# Patient Record
Sex: MALE | Race: WHITE | NOT HISPANIC OR LATINO | Employment: OTHER | URBAN - METROPOLITAN AREA
[De-identification: names, ages, dates, MRNs, and addresses within clinical notes are randomized per-mention and may not be internally consistent; named-entity substitution may affect disease eponyms.]

---

## 2018-07-26 ENCOUNTER — HISTORICAL (OUTPATIENT)
Dept: ADMINISTRATIVE | Facility: HOSPITAL | Age: 73
End: 2018-07-26

## 2018-07-26 LAB
ABS NEUT (OLG): 5.75 X10(3)/MCL (ref 2.1–9.2)
ALBUMIN SERPL-MCNC: 3.5 GM/DL (ref 3.4–5)
ALBUMIN/GLOB SERPL: 1.1 {RATIO}
ALP SERPL-CCNC: 48 UNIT/L (ref 50–136)
ALT SERPL-CCNC: 24 UNIT/L (ref 12–78)
APPEARANCE, UA: CLEAR
APTT PPP: 56.5 SECOND(S) (ref 24.8–36.9)
AST SERPL-CCNC: 13 UNIT/L (ref 15–37)
BACTERIA SPEC CULT: NORMAL /HPF
BASOPHILS # BLD AUTO: 0 X10(3)/MCL (ref 0–0.2)
BASOPHILS NFR BLD AUTO: 1 %
BILIRUB SERPL-MCNC: 0.3 MG/DL (ref 0.2–1)
BILIRUB UR QL STRIP: NEGATIVE
BILIRUBIN DIRECT+TOT PNL SERPL-MCNC: 0.1 MG/DL (ref 0–0.2)
BILIRUBIN DIRECT+TOT PNL SERPL-MCNC: 0.2 MG/DL (ref 0–0.8)
BUN SERPL-MCNC: 19 MG/DL (ref 7–18)
CALCIUM SERPL-MCNC: 9.4 MG/DL (ref 8.5–10.1)
CHLORIDE SERPL-SCNC: 104 MMOL/L (ref 98–107)
CO2 SERPL-SCNC: 29 MMOL/L (ref 21–32)
COLOR UR: YELLOW
CREAT SERPL-MCNC: 1.32 MG/DL (ref 0.7–1.3)
EOSINOPHIL # BLD AUTO: 0.2 X10(3)/MCL (ref 0–0.9)
EOSINOPHIL NFR BLD AUTO: 2 %
ERYTHROCYTE [DISTWIDTH] IN BLOOD BY AUTOMATED COUNT: 12.7 % (ref 11.5–17)
GLOBULIN SER-MCNC: 3.2 GM/DL (ref 2.4–3.5)
GLUCOSE (UA): NEGATIVE
GLUCOSE SERPL-MCNC: 150 MG/DL (ref 74–106)
HCT VFR BLD AUTO: 40.9 % (ref 42–52)
HGB BLD-MCNC: 13.6 GM/DL (ref 14–18)
HGB UR QL STRIP: NEGATIVE
INR PPP: 0.93 (ref 0–1.27)
KETONES UR QL STRIP: NEGATIVE
LEUKOCYTE ESTERASE UR QL STRIP: NEGATIVE
LYMPHOCYTES # BLD AUTO: 2.2 X10(3)/MCL (ref 0.6–4.6)
LYMPHOCYTES NFR BLD AUTO: 24 %
MCH RBC QN AUTO: 30.6 PG (ref 27–31)
MCHC RBC AUTO-ENTMCNC: 33.3 GM/DL (ref 33–36)
MCV RBC AUTO: 92.1 FL (ref 80–94)
MONOCYTES # BLD AUTO: 0.8 X10(3)/MCL (ref 0.1–1.3)
MONOCYTES NFR BLD AUTO: 9 %
NEUTROPHILS # BLD AUTO: 5.75 X10(3)/MCL (ref 2.1–9.2)
NEUTROPHILS NFR BLD AUTO: 64 %
NITRITE UR QL STRIP: NEGATIVE
PH UR STRIP: 5.5 [PH] (ref 5–9)
PLATELET # BLD AUTO: 241 X10(3)/MCL (ref 130–400)
PMV BLD AUTO: 9 FL (ref 9.4–12.4)
POTASSIUM SERPL-SCNC: 4.9 MMOL/L (ref 3.5–5.1)
PROT SERPL-MCNC: 6.7 GM/DL (ref 6.4–8.2)
PROT UR QL STRIP: NEGATIVE
PROTHROMBIN TIME: 12.8 SECOND(S) (ref 12.2–14.7)
RBC # BLD AUTO: 4.44 X10(6)/MCL (ref 4.7–6.1)
RBC #/AREA URNS HPF: NORMAL /[HPF]
SODIUM SERPL-SCNC: 140 MMOL/L (ref 136–145)
SP GR UR STRIP: 1.02 (ref 1–1.03)
SQUAMOUS EPITHELIAL, UA: NORMAL
UROBILINOGEN UR STRIP-ACNC: 0.2
WBC # SPEC AUTO: 9 X10(3)/MCL (ref 4.5–11.5)
WBC #/AREA URNS HPF: NORMAL /HPF

## 2020-09-28 ENCOUNTER — HISTORICAL (OUTPATIENT)
Dept: MEDSURG UNIT | Facility: HOSPITAL | Age: 75
End: 2020-09-28

## 2020-09-28 LAB
ABS NEUT (OLG): 7.15 X10(3)/MCL (ref 2.1–9.2)
BASOPHILS # BLD AUTO: 0 X10(3)/MCL (ref 0–0.2)
BASOPHILS NFR BLD AUTO: 0 %
BUN SERPL-MCNC: 18.5 MG/DL (ref 8.4–25.7)
CALCIUM SERPL-MCNC: 9.2 MG/DL (ref 8.8–10)
CHLORIDE SERPL-SCNC: 101 MMOL/L (ref 98–107)
CO2 SERPL-SCNC: 25 MMOL/L (ref 23–31)
CREAT SERPL-MCNC: 1.11 MG/DL (ref 0.73–1.18)
CREAT/UREA NIT SERPL: 17
EOSINOPHIL # BLD AUTO: 0.1 X10(3)/MCL (ref 0–0.9)
EOSINOPHIL NFR BLD AUTO: 1 %
ERYTHROCYTE [DISTWIDTH] IN BLOOD BY AUTOMATED COUNT: 13 % (ref 11.5–17)
GLUCOSE SERPL-MCNC: 320 MG/DL (ref 82–115)
HCT VFR BLD AUTO: 45 % (ref 42–52)
HGB BLD-MCNC: 14.6 GM/DL (ref 14–18)
LYMPHOCYTES # BLD AUTO: 1.8 X10(3)/MCL (ref 0.6–4.6)
LYMPHOCYTES NFR BLD AUTO: 18 %
MCH RBC QN AUTO: 30.2 PG (ref 27–31)
MCHC RBC AUTO-ENTMCNC: 32.4 GM/DL (ref 33–36)
MCV RBC AUTO: 93 FL (ref 80–94)
MONOCYTES # BLD AUTO: 0.7 X10(3)/MCL (ref 0.1–1.3)
MONOCYTES NFR BLD AUTO: 7 %
NEUTROPHILS # BLD AUTO: 7.15 X10(3)/MCL (ref 2.1–9.2)
NEUTROPHILS NFR BLD AUTO: 72 %
PLATELET # BLD AUTO: 275 X10(3)/MCL (ref 130–400)
PMV BLD AUTO: 9.7 FL (ref 9.4–12.4)
POTASSIUM SERPL-SCNC: 4.8 MMOL/L (ref 3.5–5.1)
RBC # BLD AUTO: 4.84 X10(6)/MCL (ref 4.7–6.1)
SODIUM SERPL-SCNC: 136 MMOL/L (ref 136–145)
WBC # SPEC AUTO: 9.9 X10(3)/MCL (ref 4.5–11.5)

## 2020-09-29 LAB
ABS NEUT (OLG): 8.95 X10(3)/MCL (ref 2.1–9.2)
ALBUMIN SERPL-MCNC: 3.7 GM/DL (ref 3.4–4.8)
ALBUMIN/GLOB SERPL: 1.4 RATIO (ref 1.1–2)
ALP SERPL-CCNC: 48 UNIT/L (ref 40–150)
ALT SERPL-CCNC: 15 UNIT/L (ref 0–55)
AST SERPL-CCNC: 16 UNIT/L (ref 5–34)
BASOPHILS # BLD AUTO: 0 X10(3)/MCL (ref 0–0.2)
BASOPHILS NFR BLD AUTO: 0 %
BILIRUB SERPL-MCNC: 0.6 MG/DL
BILIRUBIN DIRECT+TOT PNL SERPL-MCNC: 0.2 MG/DL (ref 0–0.5)
BILIRUBIN DIRECT+TOT PNL SERPL-MCNC: 0.4 MG/DL (ref 0–0.8)
BUN SERPL-MCNC: 19.4 MG/DL (ref 8.4–25.7)
CALCIUM SERPL-MCNC: 8.6 MG/DL (ref 8.8–10)
CHLORIDE SERPL-SCNC: 100 MMOL/L (ref 98–107)
CO2 SERPL-SCNC: 27 MMOL/L (ref 23–31)
CREAT SERPL-MCNC: 0.92 MG/DL (ref 0.73–1.18)
EOSINOPHIL # BLD AUTO: 0.1 X10(3)/MCL (ref 0–0.9)
EOSINOPHIL NFR BLD AUTO: 1 %
ERYTHROCYTE [DISTWIDTH] IN BLOOD BY AUTOMATED COUNT: 13 % (ref 11.5–17)
GLOBULIN SER-MCNC: 2.7 GM/DL (ref 2.4–3.5)
GLUCOSE SERPL-MCNC: 345 MG/DL (ref 82–115)
HCT VFR BLD AUTO: 40.8 % (ref 42–52)
HGB BLD-MCNC: 12.9 GM/DL (ref 14–18)
LYMPHOCYTES # BLD AUTO: 0.4 X10(3)/MCL (ref 0.6–4.6)
LYMPHOCYTES NFR BLD AUTO: 4 %
MCH RBC QN AUTO: 30.2 PG (ref 27–31)
MCHC RBC AUTO-ENTMCNC: 31.6 GM/DL (ref 33–36)
MCV RBC AUTO: 95.6 FL (ref 80–94)
MONOCYTES # BLD AUTO: 0.6 X10(3)/MCL (ref 0.1–1.3)
MONOCYTES NFR BLD AUTO: 6 %
NEUTROPHILS # BLD AUTO: 8.95 X10(3)/MCL (ref 2.1–9.2)
NEUTROPHILS NFR BLD AUTO: 88 %
PLATELET # BLD AUTO: 231 X10(3)/MCL (ref 130–400)
PMV BLD AUTO: 9.8 FL (ref 9.4–12.4)
POTASSIUM SERPL-SCNC: 5.1 MMOL/L (ref 3.5–5.1)
PROT SERPL-MCNC: 6.4 GM/DL (ref 5.8–7.6)
RBC # BLD AUTO: 4.27 X10(6)/MCL (ref 4.7–6.1)
SODIUM SERPL-SCNC: 135 MMOL/L (ref 136–145)
WBC # SPEC AUTO: 10.2 X10(3)/MCL (ref 4.5–11.5)

## 2021-05-14 ENCOUNTER — HISTORICAL (OUTPATIENT)
Dept: CARDIOLOGY | Facility: HOSPITAL | Age: 76
End: 2021-05-14

## 2022-04-30 NOTE — OP NOTE
DATE OF SURGERY:    09/28/2020    SURGEON:  Bishnu Galo MD    INDICATION FOR PROCEDURE:  Chest pain, positive stress test, severe carotid artery stenosis of the right internal carotid artery.     Coronary artery disease, status post CABG.    PROCEDURES PERFORMED:    1. Moderate sedation.  2. Right femoral artery access.  3. Selective right and left coronary angiogram.  4. Selective angiogram of vein graft to diagonal.  5. Selective angiogram of left internal mammary artery to left anterior descending.  6. Selective right and left internal carotid artery angiogram.  7. CSI atherectomy of the proximal right coronary artery.  8. Balloon angioplasty and stenting of the proximal right coronary artery using 3 x 24 mm drug-eluting stent.  9. Transvenous pacemaker placement.    PROCEDURE IN DETAIL:  The patient was brought to the cardiac catheterization laboratory in a fasting nonsedated state, prepped and draped in usual sterile fashion.  2% lidocaine was used in the right groin area as local anesthesia.  Then, using a micropuncture needle, right femoral artery was cannulated.  6-Cymro 11 cm arterial sheath was placed.  Then using a JL4 and JR4 catheter, left and right coronary ostia were selectively engaged.  Multiple angiographic views were obtained under fluoroscopy.  We used a JR4 catheter for the vein graft to the diagonal and the LIMA to the LAD.  Multiple angiographic views were obtained under fluoroscopy.  We used a JR4 catheter for angiogram of the bilateral carotids.     There was a heavily calcified 90% to 95% stenosis of the proximal RCA.  We could not even get a balloon down the proximal RCA.  We used a JR4 guide, engaged the RCA, wired this lesion with a Jose blue wire.  We could not get a balloon down, so proceeded with atherectomy.  We chose CSI rotational atherectomy.  We placed a transvenous pacemaker and did 2 passes with CSI atherectomy and orbital atherectomy.  Once the orbital  atherectomy was performed, we pre-dilated the lesion using a 2.75 balloon and stented it using a 3 x 24 mm drug-eluting stent.  Post-balloon angioplasty and stenting angiogram showed excellent stent position.  No evidence of residual stenosis, distal emboli or dissection.  All catheters and wires were then removed.  The procedure was deemed complete.    RESULTS:    1. The left main bifurcated into the LAD and left circumflex artery.  Left main has no significant stenosis or disease.  2. The left circumflex artery is a codominant system.  After the takeoff of the 2nd obtuse marginal, there was 50% stenosis in the left circumflex artery and gives rise to a moderate-sized PDA.  3. The LAD is totally occluded.  4. The right coronary artery has 95% stenosis, heavily calcified proximal lesion, status post CS atherectomy and balloon angioplasty and stenting using a 3 x 24 mm drug-eluting stent.  5. The vein graft to the diagonal is patent on the origin body and site of insertion fills the diagonal well in antegrade and retrograde fashion.  6. The LIMA to LAD is patent in the origin body and site of insertion.  It fills the LAD well in antegrade fashion and retrograde fashion.  7. The right common carotid is patent.  8. The right internal carotid artery has 50% stenosis.  9. The left common carotid is patent.  The left internal carotid artery has 10% to 20% stenosis.    PLAN/ASSESSMENT:  Orbital atherectomy and balloon angioplasty and stenting of proximal right coronary artery using a 3 x 24 mm drug-eluting stent.  The patient will be on aspirin and Plavix, patient will be discharged home later today.        ______________________________  MD DHAVAL Ibrahim/UW  DD:  09/28/2020  Time:  11:08AM  DT:  09/28/2020  Time:  11:32AM  Job #:  [id number]

## 2022-08-05 ENCOUNTER — HOSPITAL ENCOUNTER (INPATIENT)
Facility: HOSPITAL | Age: 77
LOS: 1 days | Discharge: HOME | DRG: 247 | End: 2022-08-06
Attending: INTERNAL MEDICINE | Admitting: INTERNAL MEDICINE
Payer: MEDICARE

## 2022-08-05 DIAGNOSIS — R94.8 ABNORMAL PET SCAN, MEDIASTINUM: Primary | ICD-10-CM

## 2022-08-05 DIAGNOSIS — R06.02 SOB (SHORTNESS OF BREATH): ICD-10-CM

## 2022-08-05 DIAGNOSIS — I25.10 CAD (CORONARY ARTERY DISEASE): ICD-10-CM

## 2022-08-05 LAB
POCT GLUCOSE: 131 MG/DL (ref 70–110)
POCT GLUCOSE: 170 MG/DL (ref 70–110)

## 2022-08-05 PROCEDURE — 93005 ELECTROCARDIOGRAM TRACING: CPT

## 2022-08-05 PROCEDURE — 27201423 OPTIME MED/SURG SUP & DEVICES STERILE SUPPLY: Performed by: INTERNAL MEDICINE

## 2022-08-05 PROCEDURE — C1885 CATH, TRANSLUMIN ANGIO LASER: HCPCS | Performed by: INTERNAL MEDICINE

## 2022-08-05 PROCEDURE — 92978 ENDOLUMINL IVUS OCT C 1ST: CPT | Performed by: INTERNAL MEDICINE

## 2022-08-05 PROCEDURE — 93010 ELECTROCARDIOGRAM REPORT: CPT | Mod: XE,,, | Performed by: INTERNAL MEDICINE

## 2022-08-05 PROCEDURE — C1725 CATH, TRANSLUMIN NON-LASER: HCPCS | Performed by: INTERNAL MEDICINE

## 2022-08-05 PROCEDURE — 93010 EKG 12-LEAD: ICD-10-PCS | Mod: 76,XE,, | Performed by: INTERNAL MEDICINE

## 2022-08-05 PROCEDURE — C1887 CATHETER, GUIDING: HCPCS | Performed by: INTERNAL MEDICINE

## 2022-08-05 PROCEDURE — 99152 MOD SED SAME PHYS/QHP 5/>YRS: CPT | Performed by: INTERNAL MEDICINE

## 2022-08-05 PROCEDURE — 99153 MOD SED SAME PHYS/QHP EA: CPT | Performed by: INTERNAL MEDICINE

## 2022-08-05 PROCEDURE — 25500020 PHARM REV CODE 255: Performed by: INTERNAL MEDICINE

## 2022-08-05 PROCEDURE — C1874 STENT, COATED/COV W/DEL SYS: HCPCS | Performed by: INTERNAL MEDICINE

## 2022-08-05 PROCEDURE — 25000003 PHARM REV CODE 250: Performed by: INTERNAL MEDICINE

## 2022-08-05 PROCEDURE — 93459 L HRT ART/GRFT ANGIO: CPT | Mod: XU | Performed by: INTERNAL MEDICINE

## 2022-08-05 PROCEDURE — C9600 PERC DRUG-EL COR STENT SING: HCPCS | Mod: LC | Performed by: INTERNAL MEDICINE

## 2022-08-05 PROCEDURE — 92924 PRQ TRLUML C ATHRC 1 ART&/BR: CPT | Mod: RC | Performed by: INTERNAL MEDICINE

## 2022-08-05 PROCEDURE — 11000001 HC ACUTE MED/SURG PRIVATE ROOM

## 2022-08-05 PROCEDURE — C1769 GUIDE WIRE: HCPCS | Performed by: INTERNAL MEDICINE

## 2022-08-05 PROCEDURE — C1753 CATH, INTRAVAS ULTRASOUND: HCPCS | Performed by: INTERNAL MEDICINE

## 2022-08-05 PROCEDURE — C1894 INTRO/SHEATH, NON-LASER: HCPCS | Performed by: INTERNAL MEDICINE

## 2022-08-05 PROCEDURE — 63600175 PHARM REV CODE 636 W HCPCS: Performed by: INTERNAL MEDICINE

## 2022-08-05 DEVICE — STENT RONYX27518UX RESOLUTE ONYX 2.75X18
Type: IMPLANTABLE DEVICE | Site: HEART | Status: FUNCTIONAL
Brand: RESOLUTE ONYX™

## 2022-08-05 RX ORDER — CLOPIDOGREL BISULFATE 75 MG/1
75 TABLET ORAL DAILY
Qty: 90 TABLET | Refills: 3 | Status: SHIPPED | OUTPATIENT
Start: 2022-08-05 | End: 2022-08-06 | Stop reason: SDUPTHER

## 2022-08-05 RX ORDER — NAPROXEN SODIUM 220 MG/1
81 TABLET, FILM COATED ORAL DAILY
Status: DISCONTINUED | OUTPATIENT
Start: 2022-08-06 | End: 2022-08-06 | Stop reason: HOSPADM

## 2022-08-05 RX ORDER — DIPHENHYDRAMINE HCL 25 MG
50 CAPSULE ORAL
Status: DISCONTINUED | OUTPATIENT
Start: 2022-08-05 | End: 2022-08-06 | Stop reason: HOSPADM

## 2022-08-05 RX ORDER — ROSUVASTATIN CALCIUM 40 MG/1
40 TABLET, COATED ORAL
COMMUNITY

## 2022-08-05 RX ORDER — SODIUM CHLORIDE 9 MG/ML
INJECTION, SOLUTION INTRAVENOUS CONTINUOUS
Status: ACTIVE | OUTPATIENT
Start: 2022-08-05 | End: 2022-08-05

## 2022-08-05 RX ORDER — HYDROCODONE BITARTRATE AND ACETAMINOPHEN 5; 325 MG/1; MG/1
1 TABLET ORAL EVERY 4 HOURS PRN
Status: DISCONTINUED | OUTPATIENT
Start: 2022-08-05 | End: 2022-08-06 | Stop reason: HOSPADM

## 2022-08-05 RX ORDER — GABAPENTIN 300 MG/1
300 CAPSULE ORAL 3 TIMES DAILY
Status: DISCONTINUED | OUTPATIENT
Start: 2022-08-05 | End: 2022-08-06 | Stop reason: HOSPADM

## 2022-08-05 RX ORDER — INSULIN GLARGINE 100 [IU]/ML
40 INJECTION, SOLUTION SUBCUTANEOUS
COMMUNITY
Start: 2022-05-11

## 2022-08-05 RX ORDER — CLOPIDOGREL 300 MG/1
TABLET, FILM COATED ORAL
Status: DISCONTINUED | OUTPATIENT
Start: 2022-08-05 | End: 2022-08-06 | Stop reason: HOSPADM

## 2022-08-05 RX ORDER — ATORVASTATIN CALCIUM 40 MG/1
80 TABLET, FILM COATED ORAL NIGHTLY
Status: DISCONTINUED | OUTPATIENT
Start: 2022-08-05 | End: 2022-08-06 | Stop reason: HOSPADM

## 2022-08-05 RX ORDER — MIDAZOLAM HYDROCHLORIDE 1 MG/ML
INJECTION, SOLUTION INTRAMUSCULAR; INTRAVENOUS
Status: DISCONTINUED | OUTPATIENT
Start: 2022-08-05 | End: 2022-08-06 | Stop reason: HOSPADM

## 2022-08-05 RX ORDER — SODIUM CHLORIDE 9 MG/ML
INJECTION, SOLUTION INTRAVENOUS CONTINUOUS
Status: DISCONTINUED | OUTPATIENT
Start: 2022-08-06 | End: 2022-08-06 | Stop reason: HOSPADM

## 2022-08-05 RX ORDER — ACETAMINOPHEN 325 MG/1
650 TABLET ORAL EVERY 4 HOURS PRN
Status: DISCONTINUED | OUTPATIENT
Start: 2022-08-05 | End: 2022-08-06 | Stop reason: HOSPADM

## 2022-08-05 RX ORDER — GABAPENTIN 300 MG/1
1 CAPSULE ORAL 3 TIMES DAILY
COMMUNITY
Start: 2022-05-11 | End: 2023-05-11

## 2022-08-05 RX ORDER — SODIUM CHLORIDE 9 MG/ML
INJECTION, SOLUTION INTRAVENOUS ONCE
Status: COMPLETED | OUTPATIENT
Start: 2022-08-05 | End: 2022-08-05

## 2022-08-05 RX ORDER — LISINOPRIL 10 MG/1
1 TABLET ORAL DAILY
COMMUNITY
Start: 2022-05-12 | End: 2023-05-12

## 2022-08-05 RX ORDER — HEPARIN SODIUM 1000 [USP'U]/ML
INJECTION, SOLUTION INTRAVENOUS; SUBCUTANEOUS
Status: DISCONTINUED | OUTPATIENT
Start: 2022-08-05 | End: 2022-08-06 | Stop reason: HOSPADM

## 2022-08-05 RX ORDER — METFORMIN HYDROCHLORIDE 500 MG/1
500 TABLET ORAL 3 TIMES DAILY
COMMUNITY

## 2022-08-05 RX ORDER — ASPIRIN 325 MG
TABLET ORAL
Status: DISCONTINUED | OUTPATIENT
Start: 2022-08-05 | End: 2022-08-06 | Stop reason: HOSPADM

## 2022-08-05 RX ORDER — ATROPINE SULFATE 0.1 MG/ML
0.5 INJECTION INTRAVENOUS
Status: DISCONTINUED | OUTPATIENT
Start: 2022-08-05 | End: 2022-08-06 | Stop reason: HOSPADM

## 2022-08-05 RX ORDER — LIDOCAINE HYDROCHLORIDE 20 MG/ML
INJECTION, SOLUTION INFILTRATION; PERINEURAL
Status: DISCONTINUED | OUTPATIENT
Start: 2022-08-05 | End: 2022-08-06 | Stop reason: HOSPADM

## 2022-08-05 RX ORDER — MAG HYDROX/ALUMINUM HYD/SIMETH 200-200-20
SUSPENSION, ORAL (FINAL DOSE FORM) ORAL
Status: DISCONTINUED | OUTPATIENT
Start: 2022-08-05 | End: 2022-08-06 | Stop reason: HOSPADM

## 2022-08-05 RX ORDER — CLOPIDOGREL BISULFATE 75 MG/1
300 TABLET ORAL
Status: ON HOLD | COMMUNITY
Start: 2022-08-02 | End: 2022-08-05 | Stop reason: HOSPADM

## 2022-08-05 RX ORDER — ONDANSETRON 4 MG/1
8 TABLET, ORALLY DISINTEGRATING ORAL EVERY 8 HOURS PRN
Status: DISCONTINUED | OUTPATIENT
Start: 2022-08-05 | End: 2022-08-06 | Stop reason: HOSPADM

## 2022-08-05 RX ORDER — VENLAFAXINE 75 MG/1
75 TABLET ORAL
COMMUNITY

## 2022-08-05 RX ORDER — CLOPIDOGREL BISULFATE 75 MG/1
75 TABLET ORAL DAILY
Status: DISCONTINUED | OUTPATIENT
Start: 2022-08-06 | End: 2022-08-06 | Stop reason: HOSPADM

## 2022-08-05 RX ORDER — FENTANYL CITRATE 50 UG/ML
INJECTION, SOLUTION INTRAMUSCULAR; INTRAVENOUS
Status: DISCONTINUED | OUTPATIENT
Start: 2022-08-05 | End: 2022-08-06 | Stop reason: HOSPADM

## 2022-08-05 RX ORDER — DIAZEPAM 5 MG/1
10 TABLET ORAL
Status: DISCONTINUED | OUTPATIENT
Start: 2022-08-05 | End: 2022-08-06 | Stop reason: HOSPADM

## 2022-08-05 RX ORDER — LISINOPRIL 10 MG/1
10 TABLET ORAL DAILY
Status: DISCONTINUED | OUTPATIENT
Start: 2022-08-06 | End: 2022-08-06 | Stop reason: HOSPADM

## 2022-08-05 RX ORDER — VENLAFAXINE 37.5 MG/1
75 TABLET ORAL DAILY
Status: DISCONTINUED | OUTPATIENT
Start: 2022-08-06 | End: 2022-08-06 | Stop reason: HOSPADM

## 2022-08-05 RX ADMIN — SODIUM CHLORIDE: 9 INJECTION, SOLUTION INTRAVENOUS at 06:08

## 2022-08-05 RX ADMIN — DIAZEPAM 10 MG: 5 TABLET ORAL at 06:08

## 2022-08-05 RX ADMIN — SODIUM CHLORIDE: 9 INJECTION, SOLUTION INTRAVENOUS at 11:08

## 2022-08-05 RX ADMIN — DIPHENHYDRAMINE HYDROCHLORIDE 50 MG: 25 CAPSULE ORAL at 06:08

## 2022-08-05 NOTE — Clinical Note
The catheter was inserted into the, was removed from the and was inserted over the wire into the distal   circumflex. Used for support

## 2022-08-05 NOTE — Clinical Note
The catheter was repositioned into the ostial SVG graft. An angiography was performed of the graft. Multiple views were taken. The angiography was performed via power injection.  svg-diag graft angiography, then removed

## 2022-08-05 NOTE — Clinical Note
The catheter was inserted into the ostium   left main. Hemodynamics were performed.  An angiography was performed of the left coronary arteries. Multiple views were taken. The angiography was performed via power injection.  Then removed

## 2022-08-05 NOTE — BRIEF OP NOTE
Brief Operative Note:    : Galileo Raymond MD     Referring Physician: Galileo Raymond     All Operators: Surgeon(s):  Galileo Raymond MD     Preoperative Diagnosis: Abnormal PET scan, mediastinum [R94.8]SOB (shortness of breath) [R06.02]     Postop Diagnosis: Abnormal PET scan, mediastinum [R94.8]SOB (shortness of breath) [R06.02]    Treatments/Procedures: Procedure(s) (LRB):  ANGIOGRAM,CORONARY,WITH LEFT HEART CATHETERIZATION (N/A)    Findings:  LAD. Patent MTZ-LAD and SVG-Diag. Severe ISR RCA stent s/p laser arthrectomy and PTCA with 3 mm NC at high pressures. 95% LCx/OM lesion with JESENIA flow in OM distal to the lesion. S/p PTCA and stent with 2.75 mm BJ and optimized proximally with 3 mm NC. LVEDP 5 mm Hg. See catheterization report for full details.    Estimated Blood loss: 20 cc    Specimens removed: No    Galileo Raymond MD

## 2022-08-05 NOTE — Clinical Note
The catheter was inserted into the ostial  left coronary artery. Hemodynamics were performed.  An angiography was performed of the left coronary arteries. Multiple views were taken. The angiography was performed via power injection.  The catheter was unable to engage the area..Then removed

## 2022-08-05 NOTE — INTERVAL H&P NOTE
Patient name: Jacky Baez  MRN: [id number]  : 1945  Cath Lab Procedure H&P Update    Pre-Procedure Assessment:    I saw and examined the patient face to face. The patient has been re-evaluated and his condition is unchanged. The reason for admission, procedure and care is still present.  Based on the patients H&P, pre-procedure physical exam, relevant diagnostic studies, NPO status and information obtained from the patient, I determine the patient is an appropriate candidate for the proposed procedure and anesthesia planned. I further certify the anesthesia risks, benefits and options have been explained to the patient to which he agrees as documented on the procedural consent.

## 2022-08-05 NOTE — Clinical Note
The catheter was inserted into the and was inserted over the wire into the distal   circumflex. Used for wire exchange

## 2022-08-05 NOTE — Clinical Note
The catheter was inserted into the ostial LIMA graft. An angiography was performed of the graft. Multiple views were taken. The angiography was performed via power injection.  Lima-LAD graft angiography, then removed

## 2022-08-05 NOTE — DISCHARGE SUMMARY
OUTCOME: Patient tolerated treatment/procedure well without complication and is now ready for discharge.    DISPOSITION: Home or Self Care    FINAL DIAGNOSIS:  Syncope, abnormal stress test    FOLLOWUP: In clinic    DISCHARGE INSTRUCTIONS:   Discharge Procedure Orders   Diet Cardiac     Call MD for:  temperature >100.4     Call MD for:  persistent nausea and vomiting     Call MD for:  severe uncontrolled pain     Call MD for:  difficulty breathing, headache or visual disturbances     Call MD for:  redness, tenderness, or signs of infection (pain, swelling, redness, odor or green/yellow discharge around incision site)     Remove dressing in 24 hours       TIME SPENT ON DISCHARGE: 31minutes

## 2022-08-06 VITALS
BODY MASS INDEX: 29.7 KG/M2 | HEIGHT: 68 IN | HEART RATE: 92 BPM | SYSTOLIC BLOOD PRESSURE: 144 MMHG | OXYGEN SATURATION: 97 % | RESPIRATION RATE: 18 BRPM | DIASTOLIC BLOOD PRESSURE: 84 MMHG | WEIGHT: 196 LBS | TEMPERATURE: 98 F

## 2022-08-06 PROBLEM — I25.10 CAD (CORONARY ARTERY DISEASE): Status: ACTIVE | Noted: 2022-08-06

## 2022-08-06 LAB
ALBUMIN SERPL-MCNC: 3.3 GM/DL (ref 3.4–4.8)
ALBUMIN/GLOB SERPL: 1.4 RATIO (ref 1.1–2)
ALP SERPL-CCNC: 75 UNIT/L (ref 40–150)
ALT SERPL-CCNC: 8 UNIT/L (ref 0–55)
APTT PPP: 62.4 SECONDS (ref 23.2–33.7)
AST SERPL-CCNC: 12 UNIT/L (ref 5–34)
BASOPHILS # BLD AUTO: 0.03 X10(3)/MCL (ref 0–0.2)
BASOPHILS NFR BLD AUTO: 0.5 %
BILIRUBIN DIRECT+TOT PNL SERPL-MCNC: 0.5 MG/DL
BUN SERPL-MCNC: 10.9 MG/DL (ref 8.4–25.7)
CALCIUM SERPL-MCNC: 8.8 MG/DL (ref 8.8–10)
CHLORIDE SERPL-SCNC: 107 MMOL/L (ref 98–107)
CO2 SERPL-SCNC: 25 MMOL/L (ref 23–31)
CREAT SERPL-MCNC: 0.72 MG/DL (ref 0.73–1.18)
EOSINOPHIL # BLD AUTO: 0.13 X10(3)/MCL (ref 0–0.9)
EOSINOPHIL NFR BLD AUTO: 2 %
ERYTHROCYTE [DISTWIDTH] IN BLOOD BY AUTOMATED COUNT: 13.4 % (ref 11.5–17)
GLOBULIN SER-MCNC: 2.4 GM/DL (ref 2.4–3.5)
GLUCOSE SERPL-MCNC: 95 MG/DL (ref 82–115)
HCT VFR BLD AUTO: 39.1 % (ref 42–52)
HGB BLD-MCNC: 12.3 GM/DL (ref 14–18)
IMM GRANULOCYTES # BLD AUTO: 0.03 X10(3)/MCL (ref 0–0.04)
IMM GRANULOCYTES NFR BLD AUTO: 0.5 %
LYMPHOCYTES # BLD AUTO: 0.81 X10(3)/MCL (ref 0.6–4.6)
LYMPHOCYTES NFR BLD AUTO: 12.6 %
MAGNESIUM SERPL-MCNC: 1.9 MG/DL (ref 1.6–2.6)
MCH RBC QN AUTO: 29.6 PG (ref 27–31)
MCHC RBC AUTO-ENTMCNC: 31.5 MG/DL (ref 33–36)
MCV RBC AUTO: 94.2 FL (ref 80–94)
MONOCYTES # BLD AUTO: 0.52 X10(3)/MCL (ref 0.1–1.3)
MONOCYTES NFR BLD AUTO: 8.1 %
NEUTROPHILS # BLD AUTO: 4.9 X10(3)/MCL (ref 2.1–9.2)
NEUTROPHILS NFR BLD AUTO: 76.3 %
NRBC BLD AUTO-RTO: 0 %
PLATELET # BLD AUTO: 233 X10(3)/MCL (ref 130–400)
PMV BLD AUTO: 9.6 FL (ref 7.4–10.4)
POCT GLUCOSE: 87 MG/DL (ref 70–110)
POTASSIUM SERPL-SCNC: 4.2 MMOL/L (ref 3.5–5.1)
PROT SERPL-MCNC: 5.7 GM/DL (ref 5.8–7.6)
RBC # BLD AUTO: 4.15 X10(6)/MCL (ref 4.7–6.1)
SODIUM SERPL-SCNC: 140 MMOL/L (ref 136–145)
WBC # SPEC AUTO: 6.4 X10(3)/MCL (ref 4.5–11.5)

## 2022-08-06 PROCEDURE — 85025 COMPLETE CBC W/AUTO DIFF WBC: CPT | Performed by: NURSE PRACTITIONER

## 2022-08-06 PROCEDURE — 80053 COMPREHEN METABOLIC PANEL: CPT | Performed by: NURSE PRACTITIONER

## 2022-08-06 PROCEDURE — 36415 COLL VENOUS BLD VENIPUNCTURE: CPT | Performed by: NURSE PRACTITIONER

## 2022-08-06 PROCEDURE — 36415 COLL VENOUS BLD VENIPUNCTURE: CPT | Performed by: INTERNAL MEDICINE

## 2022-08-06 PROCEDURE — 25000003 PHARM REV CODE 250: Performed by: NURSE PRACTITIONER

## 2022-08-06 PROCEDURE — 85730 THROMBOPLASTIN TIME PARTIAL: CPT | Performed by: INTERNAL MEDICINE

## 2022-08-06 PROCEDURE — 83735 ASSAY OF MAGNESIUM: CPT | Performed by: NURSE PRACTITIONER

## 2022-08-06 RX ORDER — MORPHINE SULFATE 4 MG/ML
4 INJECTION, SOLUTION INTRAMUSCULAR; INTRAVENOUS ONCE
Status: DISCONTINUED | OUTPATIENT
Start: 2022-08-06 | End: 2022-08-06 | Stop reason: HOSPADM

## 2022-08-06 RX ORDER — CLOPIDOGREL BISULFATE 75 MG/1
75 TABLET ORAL DAILY
Qty: 90 TABLET | Refills: 3 | Status: SHIPPED | OUTPATIENT
Start: 2022-08-06 | End: 2023-08-06

## 2022-08-06 RX ORDER — EMPAGLIFLOZIN 10 MG/1
10 TABLET, FILM COATED ORAL DAILY
Qty: 30 TABLET | Refills: 11 | Status: SHIPPED | OUTPATIENT
Start: 2022-08-06

## 2022-08-06 NOTE — NURSING
Report given to Vanessa COLEMAN on 6th floor   Patient transported per hospital bed with cardiac monitor by Bonilla .

## 2022-08-06 NOTE — DISCHARGE SUMMARY
[facility] - 6th Floor Medical Telemetry  Cardiology  Discharge Summary      Patient Name: Jacky Baez  MRN: [id number]  Admission Date: 8/5/2022  Hospital Length of Stay: 1 days  Discharge Date and Time:  08/06/2022 6:18 AM  Attending Physician: Mike Locke MD    Discharging Provider: MAURY Mccray  Primary Care Physician: Galileo Raymond MD    HPI:   Mr. Baez presented for [facility] and underwent PTCA/laser atherectomy to RCA ISR and PTCA/stent to LCx. ACT uptrended overnight prohibiting discontinuation of sheath. PTT drawn this morning 62.4. Right groin site benign. Labs/VSS    Procedure(s) (LRB):  ANGIOGRAM,CORONARY,WITH LEFT HEART CATHETERIZATION (N/A)  IVUS, Coronary  Angioplasty-coronary  Atherectomy  Percutaneous coronary intervention (N/A)     Left main coronary artery:  Patent  Left anterior descending coronary artery:  CT0 in the midportion of the vessel  Left circumflex artery:  95% stenosis in the mid LCx near the origin of the OM 2, with JESENIA 2 flow in the ongoing circumflex and OM3.  Right coronary artery:  Dominant.  Severe In-stent restenosis     LVEDP:  6 mmHg  No significant transvalvular AV gradient by pullback method     Assessment:  -Successful laser arthrectomy and PTCA with a 3 mm noncompliant balloon at high pressures to the RCA ISR with mild residual disease.  -Successful PTCA and stenting with a 2.75x18 mm drug-eluting stent to the left circumflex, the proximal portion of stent was optimized with a 3 mm noncompliant balloon.     Plan:  -Dual anti-platelet therapy with aspirin and Plavix  -Optimization of CAD with guideline directed medical therapy     Physical Exam   HENT:   Mouth/Throat: Mucous membranes are moist.   Cardiovascular: Normal rate, regular rhythm, normal heart sounds and normal pulses. Pulmonary:      Effort: Pulmonary effort is normal.      Breath sounds: Normal breath sounds.     Musculoskeletal:         General: Normal range of motion.       Cervical back: Normal range of motion.   Neurological: He is alert.   Skin:   Dressing to right groin site CDI   Psychiatric: Mood normal.       Indwelling Lines/Drains at time of discharge:  Lines/Drains/Airways     Drain  Duration                Sheath 08/05/22 0925 Right anterior;proximal <1 day                  Significant Diagnostic Studies: Labs: BMP:   Recent Labs   Lab 08/06/22  0507      K 4.2   CO2 25   BUN 10.9   CREATININE 0.72*   CALCIUM 8.8   MG 1.90    and CBC   Recent Labs   Lab 08/06/22  0507   WBC 6.4   HGB 12.3*   HCT 39.1*        Impression:  Native CAD  --s/p-Successful laser atherectomy and PTCA to the RCA ISR with mild residual disease.  -Successful PTCA and stenting BJ to the left circumflex,  HTN  HLD  T2DM    Plan:  DC sheath now. BR x 4 hours. Will ambulate post BR completion and if no bleed, will DC home. Continue aspirin, statin, plavix.   BP stable. Continue lisinopril  Resume metformin on 8/8/22. Continue Lantus 40 units daily. Start Jardiance 10 mg daily    Discharged Condition: stable    Disposition: Home or Self Care    Follow Up:   Follow-up Information     Galileo Raymond MD Follow up on 8/17/2022.    Specialty: Cardiology  Why: @ 10:00 am  Contact information:  [address] [facility] [address] [phone]                         Patient Instructions:      Diet Cardiac     Call MD for:  temperature >100.4     Call MD for:  persistent nausea and vomiting     Call MD for:  severe uncontrolled pain     Call MD for:  difficulty breathing, headache or visual disturbances     Call MD for:  redness, tenderness, or signs of infection (pain, swelling, redness, odor or green/yellow discharge around incision site)     Remove dressing in 24 hours     Medications:  Reconciled Home Medications:      Medication List      START taking these medications    JARDIANCE 10 mg tablet  Generic drug: empagliflozin  Take 1 tablet (10 mg total) by mouth once daily.         CHANGE how you take these medications    clopidogreL 75 mg tablet  Commonly known as: PLAVIX  Take 1 tablet (75 mg total) by mouth once daily.  What changed:   · how much to take  · when to take this        CONTINUE taking these medications    gabapentin 300 MG capsule  Commonly known as: NEURONTIN  Take 1 capsule by mouth 3 (three) times daily.     insulin glargine 100 unit/mL injection  Commonly known as: Lantus  Inject 40 Units into the skin.     lisinopriL 10 MG tablet  Take 1 tablet by mouth once daily.     metFORMIN 500 MG tablet  Commonly known as: GLUCOPHAGE  Take 500 mg by mouth 3 (three) times daily.     rosuvastatin 40 MG Tab  Commonly known as: CRESTOR  Take 40 mg by mouth.     venlafaxine 75 MG tablet  Commonly known as: EFFEXOR  Take 75 mg by mouth.            Time spent on the discharge of patient: 30 minutes    MAURY Mccray  Cardiology  [facility] - 6th Floor Medical Telemetry

## 2022-08-08 LAB
POC ACTIVATED CLOTTING TIME K: 248 SEC (ref 74–137)
POC ACTIVATED CLOTTING TIME K: 254 SEC (ref 74–137)
POC ACTIVATED CLOTTING TIME K: 260 SEC (ref 74–137)
POC ACTIVATED CLOTTING TIME K: 266 SEC (ref 74–137)
POC ACTIVATED CLOTTING TIME K: 277 SEC (ref 74–137)
POCT GLUCOSE: 87 MG/DL (ref 70–110)
SAMPLE: ABNORMAL

## 2022-08-09 ENCOUNTER — PATIENT OUTREACH (OUTPATIENT)
Dept: ADMINISTRATIVE | Facility: CLINIC | Age: 77
End: 2022-08-09
Payer: MEDICARE

## 2022-08-09 NOTE — PROGRESS NOTES
C3 nurse spoke with Jacky Baez for a TCC post hospital discharge follow up call. The patient has a scheduled HOSFU appointment with Galileo Raymond MD on 8/17/22 @ 10:00am.

## 2022-08-10 NOTE — CLINICAL REVIEW
77-year-old male admitted on 08/05/2022 for left heart catheterization.  The patient was noted to have  LAD. Patent LIMA-LAD and SVG-Diag. Severe ISR RCA stent s/p laser arthrectomy and PTCA with 3 mm NC at high pressures. 95% LCx/OM lesion with JESENIA flow in OM distal to the lesion. S/p PTCA and stent with 2.75 mm BJ and optimized proximally with 3 mm NC. LVEDP 5 mm Hg.  Perioperatively, there was activated clotting time up trending which prohibited the sheath discontinuation.  In the setting of operative findings of CT 0 of the left anterior descending coronary artery necessitating the above-mentioned intervention, telemetry monitoring, perioperative monitoring for the prevention of complications, further need for monitoring related to increased activated clotting time, the patient's severity of illness and intensity of services were appropriate for an inpatient level of care as it was expected that his hospitalization wound extended beyond 2 midnights.  Although he recovered quicker than normal, this does not preclude the severity of illness and intensity of services provided in an inpatient level of care    Seamus Lowe MD  Utilization Management  Physician Advisor

## (undated) DEVICE — CATH NC QUANTUM APEX MR 3X12

## (undated) DEVICE — GUIDE LAUNCHER 6FR JR 4.0

## (undated) DEVICE — GUIDEWIRE PROWATER .014X180CM

## (undated) DEVICE — CATH BLLN FG APEX MR 2.00X8MM

## (undated) DEVICE — CATH BLLN FG APEX MR 2.50X12MM

## (undated) DEVICE — CATH CORSAIR PRO XS MICRO 135

## (undated) DEVICE — GUIDEWIRE EMERALD .035IN 260CM

## (undated) DEVICE — CATH IMPULSE MP 5FR 145CM

## (undated) DEVICE — CATH JL5 5FR

## (undated) DEVICE — VALVE CONTROL COPILOT

## (undated) DEVICE — GUIDEWIRE PILOT 50X190

## (undated) DEVICE — PAD DEFIB RADIOLUCENT ADULT

## (undated) DEVICE — Device

## (undated) DEVICE — CANNULA ADULT NASAL 7FT

## (undated) DEVICE — INTRODUCER ANGIO PINNACLE 5X10

## (undated) DEVICE — SET ANGIO ACIST CVI ANGIOTOUCH

## (undated) DEVICE — CATH GUID EBU4 LAUNCH 6FRX100

## (undated) DEVICE — CATH LASER POINT ELCA 9 X 80

## (undated) DEVICE — KIT MINI STICK MAX 5F 21GX7CM

## (undated) DEVICE — DEVICE TRAPPER EXCHANGE

## (undated) DEVICE — GUIDEWIRE RUNTHROUGH EF 180CM

## (undated) DEVICE — CATH IMPULSE IM CRV 100CM 5FR

## (undated) DEVICE — CATH EAGLE EYE ST .014X20X150

## (undated) DEVICE — GLIDESHEATH PINNACLE 6FR 10CM

## (undated) DEVICE — CATH NC QUANTUM APEX MR 3X8

## (undated) DEVICE — SET EXT NAMIC ARTERIAL 12IN

## (undated) DEVICE — DEVICE INDEFLATOR BASIX